# Patient Record
Sex: FEMALE | URBAN - METROPOLITAN AREA
[De-identification: names, ages, dates, MRNs, and addresses within clinical notes are randomized per-mention and may not be internally consistent; named-entity substitution may affect disease eponyms.]

---

## 2023-05-02 ENCOUNTER — NURSE TRIAGE (OUTPATIENT)
Dept: NURSING | Facility: CLINIC | Age: 48
End: 2023-05-02

## 2023-05-02 NOTE — TELEPHONE ENCOUNTER
Radha calls and says that today, she has felt funny. Pt. Says that her arms have felt heavy today. Pt. Says that this happened 3 times today. Pt. Says that the heavy feeling lasted 30 seconds. Pt. Will see a DrBrianna today, if symptoms return. Pt. Will try to schedule an appointment at her clinic.    Reason for Disposition    Back pain (and neurologic deficit)    Additional Information    Negative: [1] SEVERE weakness (i.e., unable to walk or barely able to walk, requires support) AND [2] new-onset or worsening    Negative: [1] Weakness (i.e., paralysis, loss of muscle strength) of the face, arm / hand, or leg / foot on one side of the body AND [2] sudden onset AND [3] present now (Exception: Bell's palsy suspected [i.e., weakness only on one side of the face, developing over hours to days, no other symptoms])    Negative: [1] Numbness (i.e., loss of sensation) of the face, arm / hand, or leg / foot on one side of the body AND [2] sudden onset AND [3] present now    Negative: [1] Loss of speech or garbled speech AND [2] sudden onset AND [3] present now    Negative: Difficult to awaken or acting confused (e.g., disoriented, slurred speech)    Negative: Sounds like a life-threatening emergency to the triager    Negative: Confusion, disorientation, or hallucinations is main symptom    Negative: Neck pain is main symptom (and having weakness, numbness, or tingling in arm / hand because of neck pain)    Negative: Back pain is main symptom (and having weakness, numbness, or tingling in leg because of back pain)    Negative: Hand pain is main symptom (and having mild weakness, numbness, or tingling in hand related to hand pain)    Negative: Dizziness is main symptom    Negative: Vision loss or change is main symptom    Negative: Followed a head injury within last 3 days    Negative: Followed a neck injury within last 3 days    Negative: [1] Tingling in both hands and/or feet AND [2] breathing faster than normal AND [3] feels  similar to prior panic attack or hyperventilation episode    Negative: Weakness in both sides of the body or weakness all over    Negative: Headache  (and neurologic deficit)    Negative: [1] Back pain AND [2] numbness (loss of sensation) in groin or rectal area    Negative: [1] Unable to urinate (or only a few drops) > 4 hours AND [2] bladder feels very full (e.g., palpable bladder or strong urge to urinate)    Negative: [1] Loss of bladder or bowel control (urine or bowel incontinence; wetting self, leaking stool) AND [2] new-onset    Negative: [1] Weakness (i.e., paralysis, loss of muscle strength) of the face, arm / hand, or leg / foot on one side of the body AND [2] sudden onset AND [3] brief (now gone)    Negative: [1] Numbness (i.e., loss of sensation) of the face, arm / hand, or leg / foot on one side of the body AND [2] sudden onset AND [3] brief (now gone)    Negative: [1] Loss of speech or garbled speech AND [2] sudden onset AND [3] brief (now gone)    Negative: Bell's palsy suspected (i.e., weakness on only one side of the face, developing over hours to days, no other symptoms)    Negative: Patient sounds very sick or weak to the triager    Negative: Neck pain (and neurologic deficit)    Protocols used: NEUROLOGIC DEFICIT-A-